# Patient Record
Sex: MALE | Race: WHITE | NOT HISPANIC OR LATINO | Employment: OTHER | URBAN - METROPOLITAN AREA
[De-identification: names, ages, dates, MRNs, and addresses within clinical notes are randomized per-mention and may not be internally consistent; named-entity substitution may affect disease eponyms.]

---

## 2021-02-05 ENCOUNTER — TELEPHONE (OUTPATIENT)
Dept: TRANSPLANT | Facility: CLINIC | Age: 66
End: 2021-02-05

## 2021-02-05 DIAGNOSIS — J44.9 CHRONIC OBSTRUCTIVE PULMONARY DISEASE, UNSPECIFIED COPD TYPE: Primary | ICD-10-CM

## 2021-02-05 DIAGNOSIS — Z76.82 LUNG TRANSPLANT CANDIDATE: ICD-10-CM

## 2021-02-08 ENCOUNTER — TELEPHONE (OUTPATIENT)
Dept: TRANSPLANT | Facility: CLINIC | Age: 66
End: 2021-02-08

## 2021-03-12 ENCOUNTER — TELEPHONE (OUTPATIENT)
Dept: TRANSPLANT | Facility: CLINIC | Age: 66
End: 2021-03-12

## 2021-03-16 ENCOUNTER — TELEPHONE (OUTPATIENT)
Dept: TRANSPLANT | Facility: CLINIC | Age: 66
End: 2021-03-16

## 2021-03-18 ENCOUNTER — HOSPITAL ENCOUNTER (OUTPATIENT)
Dept: PULMONOLOGY | Facility: CLINIC | Age: 66
Discharge: HOME | End: 2021-03-18
Attending: INTERNAL MEDICINE
Payer: COMMERCIAL

## 2021-03-18 ENCOUNTER — TELEPHONE (OUTPATIENT)
Dept: TRANSPLANT | Facility: CLINIC | Age: 66
End: 2021-03-18

## 2021-03-18 ENCOUNTER — OFFICE VISIT (OUTPATIENT)
Dept: TRANSPLANT | Facility: CLINIC | Age: 66
End: 2021-03-18
Attending: INTERNAL MEDICINE
Payer: COMMERCIAL

## 2021-03-18 ENCOUNTER — HOSPITAL ENCOUNTER (OUTPATIENT)
Dept: PULMONOLOGY | Facility: CLINIC | Age: 66
Discharge: HOME | End: 2021-03-18
Payer: COMMERCIAL

## 2021-03-18 ENCOUNTER — HOSPITAL ENCOUNTER (OUTPATIENT)
Dept: CARDIOLOGY | Facility: HOSPITAL | Age: 66
Discharge: HOME | End: 2021-03-18
Attending: INTERNAL MEDICINE
Payer: COMMERCIAL

## 2021-03-18 VITALS
DIASTOLIC BLOOD PRESSURE: 50 MMHG | HEIGHT: 70 IN | HEART RATE: 70 BPM | SYSTOLIC BLOOD PRESSURE: 90 MMHG | BODY MASS INDEX: 24.48 KG/M2 | WEIGHT: 171 LBS

## 2021-03-18 VITALS
HEIGHT: 66 IN | DIASTOLIC BLOOD PRESSURE: 57 MMHG | TEMPERATURE: 97 F | SYSTOLIC BLOOD PRESSURE: 105 MMHG | HEART RATE: 64 BPM | BODY MASS INDEX: 28.21 KG/M2 | RESPIRATION RATE: 20 BRPM | WEIGHT: 175.5 LBS | OXYGEN SATURATION: 91 %

## 2021-03-18 VITALS — HEIGHT: 66 IN | WEIGHT: 175.5 LBS | BODY MASS INDEX: 28.21 KG/M2

## 2021-03-18 DIAGNOSIS — Z76.82 LUNG TRANSPLANT CANDIDATE: ICD-10-CM

## 2021-03-18 DIAGNOSIS — J44.9 CHRONIC OBSTRUCTIVE PULMONARY DISEASE, UNSPECIFIED COPD TYPE: ICD-10-CM

## 2021-03-18 DIAGNOSIS — Z13.9 SCREENING PROCEDURE: Primary | ICD-10-CM

## 2021-03-18 DIAGNOSIS — R91.8 PULMONARY NODULES: ICD-10-CM

## 2021-03-18 DIAGNOSIS — J96.12 CHRONIC RESPIRATORY FAILURE WITH HYPOXIA AND HYPERCAPNIA: ICD-10-CM

## 2021-03-18 DIAGNOSIS — J96.11 CHRONIC RESPIRATORY FAILURE WITH HYPOXIA AND HYPERCAPNIA: ICD-10-CM

## 2021-03-18 DIAGNOSIS — J44.9 CHRONIC OBSTRUCTIVE PULMONARY DISEASE, UNSPECIFIED COPD TYPE: Primary | ICD-10-CM

## 2021-03-18 LAB
AMPHET+METHAMPHET UR QL: NEGATIVE
ASCENDING AORTA: 2.84 CM
AV INDEX (PROSTH): 0.82
AV MEAN GRADIENT: 3 MMHG
AV PEAK GRADIENT: 7 MMHG
AV VALVE AREA: 2.34 CM2
AV VELOCITY RATIO: 0.77
BARBITURATES UR QL SCN>200 NG/ML: NEGATIVE
BENZODIAZ UR QL SCN>200 NG/ML: NORMAL
BSA FOR ECHO PROCEDURE: 1.96 M2
BZE UR QL SCN: NEGATIVE
CANNABINOIDS UR QL SCN: NEGATIVE
CREAT UR-MCNC: 193 MG/DL (ref 23–375)
CV ECHO LV RWT: 0.35 CM
DLCO ADJ PRE: 6.03 ML/(MIN*MMHG) (ref 18.19–32.04)
DLCO SINGLE BREATH LLN: 18.19
DLCO SINGLE BREATH PRE REF: 24 %
DLCO SINGLE BREATH REF: 25.11
DLCOC SBVA LLN: 2.65
DLCOC SBVA PRE REF: 34.3 %
DLCOC SBVA REF: 3.96
DLCOC SINGLE BREATH LLN: 18.19
DLCOC SINGLE BREATH PRE REF: 24 %
DLCOC SINGLE BREATH REF: 25.11
DLCOCSBVAULN: 5.27
DLCOCSINGLEBREATHULN: 32.04
DLCOSINGLEBREATHULN: 32.04
DLCOVA LLN: 2.65
DLCOVA PRE REF: 34.3 %
DLCOVA PRE: 1.36 ML/(MIN*MMHG*L) (ref 2.65–5.27)
DLCOVA REF: 3.96
DLCOVAULN: 5.27
DLVAADJ PRE: 1.36 ML/(MIN*MMHG*L) (ref 2.65–5.27)
DOP CALC AO PEAK VEL: 1.35 M/S
DOP CALC AO VTI: 25 CM
DOP CALC LVOT AREA: 2.9 CM2
DOP CALC LVOT DIAMETER: 1.91 CM
DOP CALC LVOT PEAK VEL: 1.04 M/S
DOP CALC LVOT STROKE VOLUME: 58.62 CM3
DOP CALCLVOT PEAK VEL VTI: 20.47 CM
E WAVE DECELERATION TIME: 266.79 MSEC
E/A RATIO: 0.75
E/E' RATIO: 5.79 M/S
ECHO LV POSTERIOR WALL: 0.75 CM (ref 0.6–1.1)
ETHANOL UR-MCNC: <10 MG/DL
FEF 25 75 LLN: 1.14
FEF 25 75 PRE REF: 16.7 %
FEF 25 75 REF: 2.46
FEV05 LLN: 1.25
FEV05 REF: 2.38
FEV1 FVC LLN: 65
FEV1 FVC PRE REF: 51.7 %
FEV1 FVC REF: 77
FEV1 LLN: 2.22
FEV1 PRE REF: 36.3 %
FEV1 REF: 3
FRACTIONAL SHORTENING: 35 % (ref 28–44)
FVC LLN: 2.92
FVC PRE REF: 70.2 %
FVC REF: 3.88
INTERVENTRICULAR SEPTUM: 0.62 CM (ref 0.6–1.1)
IVC PRE: 2.53 L (ref 2.92–4.84)
IVC SINGLE BREATH LLN: 2.92
IVC SINGLE BREATH PRE REF: 65.1 %
IVC SINGLE BREATH REF: 3.88
IVCSINGLEBREATHULN: 4.84
IVRT: 114.18 MSEC
LA MAJOR: 5 CM
LA MINOR: 5.12 CM
LA WIDTH: 3.93 CM
LEFT ATRIUM SIZE: 3.45 CM
LEFT ATRIUM VOLUME INDEX MOD: 29.1 ML/M2
LEFT ATRIUM VOLUME INDEX: 29.9 ML/M2
LEFT ATRIUM VOLUME MOD: 56.83 CM3
LEFT ATRIUM VOLUME: 58.31 CM3
LEFT INTERNAL DIMENSION IN SYSTOLE: 2.76 CM (ref 2.1–4)
LEFT VENTRICLE DIASTOLIC VOLUME INDEX: 41.22 ML/M2
LEFT VENTRICLE DIASTOLIC VOLUME: 80.37 ML
LEFT VENTRICLE MASS INDEX: 43 G/M2
LEFT VENTRICLE SYSTOLIC VOLUME INDEX: 14.6 ML/M2
LEFT VENTRICLE SYSTOLIC VOLUME: 28.45 ML
LEFT VENTRICULAR INTERNAL DIMENSION IN DIASTOLE: 4.24 CM (ref 3.5–6)
LEFT VENTRICULAR MASS: 84.08 G
LV LATERAL E/E' RATIO: 5 M/S
LV SEPTAL E/E' RATIO: 6.88 M/S
METHADONE UR QL SCN>300 NG/ML: NEGATIVE
MV A" WAVE DURATION": 19.7 MSEC
MV PEAK A VEL: 0.73 M/S
MV PEAK E VEL: 0.55 M/S
MV STENOSIS PRESSURE HALF TIME: 77.37 MS
MV VALVE AREA P 1/2 METHOD: 2.84 CM2
OPIATES UR QL SCN: NORMAL
PCP UR QL SCN>25 NG/ML: NEGATIVE
PEF LLN: 5.99
PEF PRE REF: 43 %
PEF REF: 8.06
PHYSICIAN COMMENT: ABNORMAL
PISA TR MAX VEL: 3.22 M/S
PRE DLCO: 6.03 ML/(MIN*MMHG) (ref 18.19–32.04)
PRE FEF 25 75: 0.41 L/S (ref 1.14–3.78)
PRE FET 100: 8.2 SEC
PRE FEV05 REF: 31.3 %
PRE FEV1 FVC: 40.03 % (ref 64.66–90.21)
PRE FEV1: 1.09 L (ref 2.22–3.79)
PRE FEV5: 0.74 L (ref 1.25–3.52)
PRE FVC: 2.72 L (ref 2.92–4.84)
PRE PEF: 3.46 L/S (ref 5.99–10.13)
PULM VEIN S/D RATIO: 1.03
PV PEAK D VEL: 0.39 M/S
PV PEAK S VEL: 0.4 M/S
RA MAJOR: 4.36 CM
RA PRESSURE: 8 MMHG
RA WIDTH: 3.45 CM
RIGHT VENTRICLE STRAIN: 18
RIGHT VENTRICULAR END-DIASTOLIC DIMENSION: 4.2 CM
RV TISSUE DOPPLER FREE WALL SYSTOLIC VELOCITY 1 (APICAL 4 CHAMBER VIEW): 12.73 CM/S
SARS-COV-2 RDRP RESP QL NAA+PROBE: NEGATIVE
SINUS: 3.22 CM
STJ: 2.79 CM
TDI LATERAL: 0.11 M/S
TDI SEPTAL: 0.08 M/S
TDI: 0.1 M/S
TOXICOLOGY INFORMATION: NORMAL
TR MAX PG: 41 MMHG
TRICUSPID ANNULAR PLANE SYSTOLIC EXCURSION: 2.12 CM
TV REST PULMONARY ARTERY PRESSURE: 49 MMHG
VA PRE: 4.44 L (ref 6.19–6.19)
VA SINGLE BREATH LLN: 6.19
VA SINGLE BREATH PRE REF: 71.7 %
VA SINGLE BREATH REF: 6.19
VASINGLEBREATHULN: 6.19

## 2021-03-18 PROCEDURE — 94618 PULMONARY STRESS TESTING: CPT | Mod: S$GLB,TXP,, | Performed by: INTERNAL MEDICINE

## 2021-03-18 PROCEDURE — 93306 ECHO (CUPID ONLY): ICD-10-PCS | Mod: 26,TXP,, | Performed by: INTERNAL MEDICINE

## 2021-03-18 PROCEDURE — U0002 COVID-19 LAB TEST NON-CDC: HCPCS | Mod: TXP | Performed by: INTERNAL MEDICINE

## 2021-03-18 PROCEDURE — 94618 PULMONARY STRESS TESTING: ICD-10-PCS | Mod: S$GLB,TXP,, | Performed by: INTERNAL MEDICINE

## 2021-03-18 PROCEDURE — 1125F AMNT PAIN NOTED PAIN PRSNT: CPT | Mod: S$GLB,TXP,, | Performed by: INTERNAL MEDICINE

## 2021-03-18 PROCEDURE — 1125F PR PAIN SEVERITY QUANTIFIED, PAIN PRESENT: ICD-10-PCS | Mod: S$GLB,TXP,, | Performed by: INTERNAL MEDICINE

## 2021-03-18 PROCEDURE — 80323 ALKALOIDS NOS: CPT | Mod: TXP | Performed by: PHYSICIAN ASSISTANT

## 2021-03-18 PROCEDURE — 3008F BODY MASS INDEX DOCD: CPT | Mod: CPTII,S$GLB,TXP, | Performed by: INTERNAL MEDICINE

## 2021-03-18 PROCEDURE — 99204 OFFICE O/P NEW MOD 45 MIN: CPT | Mod: 25,S$GLB,TXP, | Performed by: INTERNAL MEDICINE

## 2021-03-18 PROCEDURE — 94727 PR PULM FUNCTION TEST BY GAS: ICD-10-PCS | Mod: 53,S$GLB,TXP, | Performed by: INTERNAL MEDICINE

## 2021-03-18 PROCEDURE — 94010 BREATHING CAPACITY TEST: CPT | Mod: S$GLB,TXP,, | Performed by: INTERNAL MEDICINE

## 2021-03-18 PROCEDURE — 93306 TTE W/DOPPLER COMPLETE: CPT | Mod: 26,TXP,, | Performed by: INTERNAL MEDICINE

## 2021-03-18 PROCEDURE — 80307 DRUG TEST PRSMV CHEM ANLYZR: CPT | Mod: TXP | Performed by: PHYSICIAN ASSISTANT

## 2021-03-18 PROCEDURE — 99999 PR PBB SHADOW E&M-EST. PATIENT-LVL V: CPT | Mod: PBBFAC,TXP,, | Performed by: INTERNAL MEDICINE

## 2021-03-18 PROCEDURE — 94727 GAS DIL/WSHOT DETER LNG VOL: CPT | Mod: 53,S$GLB,TXP, | Performed by: INTERNAL MEDICINE

## 2021-03-18 PROCEDURE — 99999 PR PBB SHADOW E&M-EST. PATIENT-LVL V: ICD-10-PCS | Mod: PBBFAC,TXP,, | Performed by: INTERNAL MEDICINE

## 2021-03-18 PROCEDURE — 94010 BREATHING CAPACITY TEST: ICD-10-PCS | Mod: S$GLB,TXP,, | Performed by: INTERNAL MEDICINE

## 2021-03-18 PROCEDURE — 99204 PR OFFICE/OUTPT VISIT, NEW, LEVL IV, 45-59 MIN: ICD-10-PCS | Mod: 25,S$GLB,TXP, | Performed by: INTERNAL MEDICINE

## 2021-03-18 PROCEDURE — 3008F PR BODY MASS INDEX (BMI) DOCUMENTED: ICD-10-PCS | Mod: CPTII,S$GLB,TXP, | Performed by: INTERNAL MEDICINE

## 2021-03-18 PROCEDURE — 94729 DIFFUSING CAPACITY: CPT | Mod: S$GLB,TXP,, | Performed by: INTERNAL MEDICINE

## 2021-03-18 PROCEDURE — 94729 PR C02/MEMBANE DIFFUSE CAPACITY: ICD-10-PCS | Mod: S$GLB,TXP,, | Performed by: INTERNAL MEDICINE

## 2021-03-18 PROCEDURE — 93306 TTE W/DOPPLER COMPLETE: CPT | Mod: TXP

## 2021-03-18 RX ORDER — ISOSORBIDE MONONITRATE 30 MG/1
30 TABLET, EXTENDED RELEASE ORAL DAILY
COMMUNITY
Start: 2021-03-02

## 2021-03-18 RX ORDER — ALBUTEROL SULFATE 0.83 MG/ML
2.5 SOLUTION RESPIRATORY (INHALATION) 2 TIMES DAILY PRN
COMMUNITY
Start: 2021-02-18

## 2021-03-18 RX ORDER — CARVEDILOL 6.25 MG/1
6.25 TABLET ORAL DAILY
COMMUNITY
Start: 2020-12-31

## 2021-03-18 RX ORDER — ALPRAZOLAM 0.25 MG/1
1 TABLET ORAL 3 TIMES DAILY PRN
COMMUNITY
Start: 2021-03-11

## 2021-03-18 RX ORDER — CARVEDILOL 12.5 MG/1
12.5 TABLET ORAL NIGHTLY
COMMUNITY
Start: 2020-12-31

## 2021-03-18 RX ORDER — ACETAMINOPHEN 160 MG/5ML
200 SUSPENSION, ORAL (FINAL DOSE FORM) ORAL 2 TIMES DAILY
COMMUNITY

## 2021-03-18 RX ORDER — OXYCODONE AND ACETAMINOPHEN 10; 325 MG/1; MG/1
1 TABLET ORAL EVERY 8 HOURS PRN
COMMUNITY
Start: 2021-03-06

## 2021-03-18 RX ORDER — SIMVASTATIN 10 MG/1
10 TABLET, FILM COATED ORAL NIGHTLY
COMMUNITY
Start: 2020-12-31

## 2021-03-18 RX ORDER — METOLAZONE 2.5 MG/1
2.5 TABLET ORAL
COMMUNITY
Start: 2021-02-25

## 2021-03-18 RX ORDER — SODIUM CHLORIDE FOR INHALATION 7 %
4 VIAL, NEBULIZER (ML) INHALATION 2 TIMES DAILY
COMMUNITY
Start: 2021-02-18

## 2021-03-18 RX ORDER — DOCUSATE SODIUM 100 MG/1
100 CAPSULE, LIQUID FILLED ORAL 2 TIMES DAILY PRN
COMMUNITY
Start: 2020-12-02

## 2021-03-18 RX ORDER — METFORMIN HYDROCHLORIDE 500 MG/1
500 TABLET, EXTENDED RELEASE ORAL 2 TIMES DAILY
COMMUNITY
Start: 2020-03-26

## 2021-03-18 RX ORDER — AMLODIPINE BESYLATE 5 MG/1
5 TABLET ORAL DAILY
COMMUNITY
Start: 2021-01-11

## 2021-03-18 RX ORDER — LISINOPRIL 10 MG/1
10 TABLET ORAL DAILY
COMMUNITY
Start: 2021-03-05

## 2021-03-18 RX ORDER — PANTOPRAZOLE SODIUM 40 MG/1
40 TABLET, DELAYED RELEASE ORAL DAILY
COMMUNITY
Start: 2021-03-06

## 2021-03-18 RX ORDER — ROPINIROLE 0.5 MG/1
0.5 TABLET, FILM COATED ORAL NIGHTLY PRN
COMMUNITY
Start: 2021-01-14

## 2021-03-18 RX ORDER — FLUTICASONE FUROATE, UMECLIDINIUM BROMIDE AND VILANTEROL TRIFENATATE 100; 62.5; 25 UG/1; UG/1; UG/1
1 POWDER RESPIRATORY (INHALATION) DAILY
COMMUNITY
Start: 2021-01-14

## 2021-03-22 LAB
ANABASINE UR-MCNC: <2 NG/ML
COTININE UR-MCNC: <5 NG/ML
NICOTINE UR-MCNC: <5 NG/ML
NORNICOTINE UR-MCNC: <2 NG/ML

## 2021-03-23 ENCOUNTER — TELEPHONE (OUTPATIENT)
Dept: TRANSPLANT | Facility: CLINIC | Age: 66
End: 2021-03-23

## 2021-03-25 ENCOUNTER — TELEPHONE (OUTPATIENT)
Dept: TRANSPLANT | Facility: CLINIC | Age: 66
End: 2021-03-25

## 2022-01-24 ENCOUNTER — OFFICE VISIT (OUTPATIENT)
Dept: PULMONOLOGY | Facility: CLINIC | Age: 67
End: 2022-01-24
Payer: MEDICARE

## 2022-01-24 VITALS
WEIGHT: 172.81 LBS | SYSTOLIC BLOOD PRESSURE: 115 MMHG | BODY MASS INDEX: 27.89 KG/M2 | OXYGEN SATURATION: 91 % | HEART RATE: 80 BPM | DIASTOLIC BLOOD PRESSURE: 72 MMHG

## 2022-01-24 DIAGNOSIS — R06.02 SHORTNESS OF BREATH: Primary | ICD-10-CM

## 2022-01-24 DIAGNOSIS — J44.9 CHRONIC OBSTRUCTIVE PULMONARY DISEASE, UNSPECIFIED COPD TYPE: ICD-10-CM

## 2022-01-24 DIAGNOSIS — J96.11 CHRONIC RESPIRATORY FAILURE WITH HYPOXIA: ICD-10-CM

## 2022-01-24 DIAGNOSIS — J47.9 BRONCHIECTASIS WITHOUT COMPLICATION: ICD-10-CM

## 2022-01-24 PROCEDURE — 99204 PR OFFICE/OUTPT VISIT, NEW, LEVL IV, 45-59 MIN: ICD-10-PCS | Mod: S$GLB,,, | Performed by: INTERNAL MEDICINE

## 2022-01-24 PROCEDURE — 99204 OFFICE O/P NEW MOD 45 MIN: CPT | Mod: S$GLB,,, | Performed by: INTERNAL MEDICINE

## 2022-01-24 RX ORDER — DOXYCYCLINE 100 MG/1
CAPSULE ORAL
COMMUNITY
Start: 2021-12-30

## 2022-01-24 RX ORDER — ESCITALOPRAM OXALATE 10 MG/1
TABLET ORAL
COMMUNITY
Start: 2021-12-14

## 2022-01-24 RX ORDER — PREDNISONE 10 MG/1
TABLET ORAL
COMMUNITY
Start: 2021-12-30

## 2022-01-24 RX ORDER — BLOOD-GLUCOSE METER
KIT MISCELLANEOUS
COMMUNITY
Start: 2022-01-19

## 2022-01-24 NOTE — PROGRESS NOTES
SUBJECTIVE:    Patient ID: Praneeth Rueda is a 66 y.o. male.    Chief Complaint: Emphysema (Ref by dr leija )    HPI The patient is here to be evaluated for possible IBV valve placement.  He has been off of cigarettes for 10 years.  He has prior PFT's showing hyperinflation.  He also has some bronchiectasis and produces about 4 ounces of mucus daily. He is short of breath walking 20 feet.  Past Medical History:   Diagnosis Date    COPD, severe     Degenerative disc disease, lumbar     Diabetes     Hypertension      Past Surgical History:   Procedure Laterality Date    FINGER AMPUTATION      right index finger    VASECTOMY       Family History   Problem Relation Age of Onset    Stroke Mother     Heart attack Mother     Alcohol abuse Father     Heart attack Maternal Grandfather     Breast cancer Maternal Aunt     Bone cancer Paternal Uncle     Pulmonary embolism Neg Hx         Social History:   Marital Status:   Occupation: Data Unavailable  Alcohol History:  reports previous alcohol use.  Tobacco History:  reports that he has quit smoking. His smoking use included cigarettes. He has a 30.00 pack-year smoking history. He has quit using smokeless tobacco.  His smokeless tobacco use included chew.  Drug History:  reports no history of drug use.    Review of patient's allergies indicates:   Allergen Reactions    Keflex [cephalexin] Anaphylaxis and Rash    Furosemide Itching and Rash       Current Outpatient Medications   Medication Sig Dispense Refill    albuterol (PROVENTIL HFA/VENTOLIN HFA) 90 mcg/Actuation inhaler Every 4 hours PRN      albuterol (PROVENTIL) 2.5 mg /3 mL (0.083 %) nebulizer solution Inhale 2.5 mg into the lungs 2 (two) times daily as needed.      ALPRAZolam (XANAX) 0.25 MG tablet Take 1 tablet by mouth 3 (three) times daily as needed.      amLODIPine (NORVASC) 5 MG tablet Take 5 mg by mouth once daily.      aspirin 81 mg Tab Take 1 tablet by mouth once daily. Every day       budesonide-formoterol 160-4.5 mcg (SYMBICORT) 160-4.5 mcg/actuation HFAA Twice a day      carvediloL (COREG) 12.5 MG tablet Take 12.5 mg by mouth every evening.      carvediloL (COREG) 6.25 MG tablet Take 6.25 mg by mouth once daily.      coenzyme Q10 200 mg capsule Take 200 mg by mouth 2 (two) times a day.      EScitalopram oxalate (LEXAPRO) 10 MG tablet       FREESTYLE LITE STRIPS Strp       isosorbide mononitrate (IMDUR) 30 MG 24 hr tablet Take 30 mg by mouth once daily.      lisinopriL 10 MG tablet Take 10 mg by mouth once daily.      metFORMIN (GLUCOPHAGE-XR) 500 MG ER 24hr tablet Take 500 mg by mouth 2 (two) times a day.      metOLazone (ZAROXOLYN) 2.5 MG tablet Take 2.5 mg by mouth twice a week. And as needed for fluid retention      oxyCODONE-acetaminophen (PERCOCET)  mg per tablet Take 1 tablet by mouth every 8 (eight) hours as needed.      OXYGEN-AIR DELIVERY SYSTEMS MISC 6 L/min by Nasal route continuous.      pantoprazole (PROTONIX) 40 MG tablet Take 40 mg by mouth once daily.      rOPINIRole (REQUIP) 0.5 MG tablet Take 0.5 mg by mouth nightly as needed.      simvastatin (ZOCOR) 10 MG tablet Take 10 mg by mouth nightly.      sodium chloride 7% 7 % nebulizer solution Take 4 mLs by nebulization 2 (two) times a day.      TRELEGY ELLIPTA 100-62.5-25 mcg DsDv Inhale 1 puff into the lungs once daily.      aspirin-calcium carbonate 81 mg-300 mg calcium(777 mg) Tab Take 81 mg by mouth once daily.      docusate sodium (COLACE) 100 MG capsule Take 100 mg by mouth 2 (two) times daily as needed.      doxycycline (VIBRAMYCIN) 100 MG Cap       hydrocodone-acetaminophen (VICODIN ES) 7.5-750 mg per tablet Take 1 tablet by mouth every 6 (six) hours as needed for Pain. Four times a day PRN (Patient not taking: Reported on 1/24/2022) 30 tablet 00    mirtazapine (REMERON) 30 MG tablet At bedtime      predniSONE (DELTASONE) 10 MG tablet       testosterone 12.5 mg/ 1.25 gram (1 %) GlPm Every  day       No current facility-administered medications for this visit.       Alpha-1 Antitrypsin:  Last PFT:   Last CT:    Review of Systems  General: Feeling Well.  Eyes: Vision is good.  ENT:  No sinusitis or pharyngitis.   Heart:: No chest pain or palpitations.  Lungs: No cough, sputum, or wheezing.  GI: No Nausea, vomiting, constipation, diarrhea, or reflux.  : No dysuria, hesitancy, or nocturia.  Musculoskeletal: No joint pain or myalgias.  Skin: No lesions or rashes.  Neuro: No headaches or neuropathy.  Lymph: No edema or adenopathy.  Psych: No anxiety or depression.  Endo: No weight change.    OBJECTIVE:      /72 (BP Location: Left arm, Patient Position: Sitting)   Pulse 80   Wt 78.4 kg (172 lb 12.8 oz)   SpO2 (!) 91% Comment: 6.0 oxygen level  BMI 27.89 kg/m²     Physical Exam  GENERAL: Older patient in no distress, sitting on a scooter.  HEENT: Pupils equal and reactive. Extraocular movements intact. Nose intact.      Pharynx moist.  NECK: Supple.   HEART: Regular rate and rhythm. No murmur or gallop auscultated.  LUNGS: Clear to auscultation and percussion. Lung excursion symmetrical. No change in fremitus. No adventitial noises.  ABDOMEN: Bowel sounds present. Non-tender, no masses palpated.  EXTREMITIES: Normal muscle tone and joint movement, no cyanosis or clubbing.   LYMPHATICS: No adenopathy palpated, 2+ edema to L leg..  SKIN: Dry, intact, no lesions.   NEURO: Cranial nerves II-XII intact. Motor strength 5/5 bilaterally, upper and lower extremities.  PSYCH: Appropriate affect.    Assessment:       1. Shortness of breath    2. Chronic obstructive pulmonary disease, unspecified COPD type    3. Chronic respiratory failure with hypoxia    4. Bronchiectasis without complication        The patient wishes to be evaluated for possible IBV valve placement.  We will see what his dedicated CT and PFT's show.  Plan:       Shortness of breath    Chronic obstructive pulmonary disease, unspecified COPD  type  -     Complete PFT with bronchodilator; Future  -     CT Chest Without Contrast; Future; Expected date: 01/24/2022    Chronic respiratory failure with hypoxia    Bronchiectasis without complication         Follow up in about 1 month (around 2/24/2022).

## 2022-02-10 ENCOUNTER — HOSPITAL ENCOUNTER (OUTPATIENT)
Dept: PULMONOLOGY | Facility: HOSPITAL | Age: 67
Discharge: HOME | End: 2022-02-10
Attending: INTERNAL MEDICINE
Payer: MEDICARE

## 2022-02-10 ENCOUNTER — HOSPITAL ENCOUNTER (OUTPATIENT)
Dept: RADIOLOGY | Facility: HOSPITAL | Age: 67
Discharge: HOME | End: 2022-02-10
Attending: INTERNAL MEDICINE
Payer: MEDICARE

## 2022-02-10 DIAGNOSIS — J44.9 CHRONIC OBSTRUCTIVE PULMONARY DISEASE, UNSPECIFIED COPD TYPE: ICD-10-CM

## 2022-02-10 PROCEDURE — 94729 DIFFUSING CAPACITY: CPT

## 2022-02-10 PROCEDURE — 71250 CT THORAX DX C-: CPT | Mod: TC

## 2022-02-10 PROCEDURE — 94010 BREATHING CAPACITY TEST: CPT

## 2022-02-10 PROCEDURE — 94727 GAS DIL/WSHOT DETER LNG VOL: CPT

## 2022-02-10 PROCEDURE — 94060 EVALUATION OF WHEEZING: CPT

## 2022-05-04 ENCOUNTER — OFFICE VISIT (OUTPATIENT)
Dept: PULMONOLOGY | Facility: CLINIC | Age: 67
End: 2022-05-04
Payer: MEDICARE

## 2022-05-04 VITALS
BODY MASS INDEX: 27.97 KG/M2 | SYSTOLIC BLOOD PRESSURE: 110 MMHG | HEIGHT: 66 IN | DIASTOLIC BLOOD PRESSURE: 80 MMHG | HEART RATE: 67 BPM | OXYGEN SATURATION: 75 % | WEIGHT: 174 LBS

## 2022-05-04 DIAGNOSIS — J44.9 CHRONIC OBSTRUCTIVE PULMONARY DISEASE, UNSPECIFIED COPD TYPE: Primary | ICD-10-CM

## 2022-05-04 DIAGNOSIS — M81.0 OSTEOPOROSIS, UNSPECIFIED OSTEOPOROSIS TYPE, UNSPECIFIED PATHOLOGICAL FRACTURE PRESENCE: ICD-10-CM

## 2022-05-04 DIAGNOSIS — J96.11 CHRONIC RESPIRATORY FAILURE WITH HYPOXIA: ICD-10-CM

## 2022-05-04 PROCEDURE — 99214 PR OFFICE/OUTPT VISIT, EST, LEVL IV, 30-39 MIN: ICD-10-PCS | Mod: S$GLB,,, | Performed by: INTERNAL MEDICINE

## 2022-05-04 PROCEDURE — 99214 OFFICE O/P EST MOD 30 MIN: CPT | Mod: S$GLB,,, | Performed by: INTERNAL MEDICINE

## 2022-05-04 NOTE — PROGRESS NOTES
SUBJECTIVE:    Patient ID: Praneeth Rueda is a 66 y.o. male.    Chief Complaint: Shortness of Breath    HPI the patient returns after CT shows predominant upper lobe emphysema but it does extend into the middle lobes.  There is also a 4 mm nodule.  The pulmonary functions show severe obstruction with an extremely severe diffusion defect but moderate restriction.  He continues to cough up approximately a half a cup of mucus a day.  He is hypoxemic here on a pulsed 6 L in addition concentrator.  He knows he is supposed to be on continuous flow at 8 L.  Past Medical History:   Diagnosis Date    COPD, severe     Degenerative disc disease, lumbar     Diabetes     Hypertension      Past Surgical History:   Procedure Laterality Date    FINGER AMPUTATION      right index finger    VASECTOMY       Family History   Problem Relation Age of Onset    Stroke Mother     Heart attack Mother     Alcohol abuse Father     Heart attack Maternal Grandfather     Breast cancer Maternal Aunt     Bone cancer Paternal Uncle     Pulmonary embolism Neg Hx         Social History:   Marital Status:   Occupation: Data Unavailable  Alcohol History:  reports previous alcohol use.  Tobacco History:  reports that he quit smoking about 7 years ago. His smoking use included cigarettes. He started smoking about 51 years ago. He has a 30.00 pack-year smoking history. He has quit using smokeless tobacco.  His smokeless tobacco use included chew.  Drug History:  reports no history of drug use.    Review of patient's allergies indicates:   Allergen Reactions    Keflex [cephalexin] Anaphylaxis and Rash    Furosemide Itching and Rash       Current Outpatient Medications   Medication Sig Dispense Refill    albuterol (PROVENTIL HFA/VENTOLIN HFA) 90 mcg/Actuation inhaler Every 4 hours PRN      albuterol (PROVENTIL) 2.5 mg /3 mL (0.083 %) nebulizer solution Inhale 2.5 mg into the lungs 2 (two) times daily as needed.      ALPRAZolam  (XANAX) 0.25 MG tablet Take 1 tablet by mouth 3 (three) times daily as needed.      amLODIPine (NORVASC) 5 MG tablet Take 5 mg by mouth once daily.      aspirin 81 mg Tab Take 1 tablet by mouth once daily. Every day      aspirin-calcium carbonate 81 mg-300 mg calcium(777 mg) Tab Take 81 mg by mouth once daily.      budesonide-formoterol 160-4.5 mcg (SYMBICORT) 160-4.5 mcg/actuation HFAA Twice a day      carvediloL (COREG) 12.5 MG tablet Take 12.5 mg by mouth every evening.      carvediloL (COREG) 6.25 MG tablet Take 6.25 mg by mouth once daily.      coenzyme Q10 200 mg capsule Take 200 mg by mouth 2 (two) times a day.      docusate sodium (COLACE) 100 MG capsule Take 100 mg by mouth 2 (two) times daily as needed.      doxycycline (VIBRAMYCIN) 100 MG Cap       EScitalopram oxalate (LEXAPRO) 10 MG tablet       FREESTYLE LITE STRIPS Strp       hydrocodone-acetaminophen (VICODIN ES) 7.5-750 mg per tablet Take 1 tablet by mouth every 6 (six) hours as needed for Pain. Four times a day PRN (Patient not taking: Reported on 1/24/2022) 30 tablet 00    isosorbide mononitrate (IMDUR) 30 MG 24 hr tablet Take 30 mg by mouth once daily.      lisinopriL 10 MG tablet Take 10 mg by mouth once daily.      metFORMIN (GLUCOPHAGE-XR) 500 MG ER 24hr tablet Take 500 mg by mouth 2 (two) times a day.      metOLazone (ZAROXOLYN) 2.5 MG tablet Take 2.5 mg by mouth twice a week. And as needed for fluid retention      mirtazapine (REMERON) 30 MG tablet At bedtime      oxyCODONE-acetaminophen (PERCOCET)  mg per tablet Take 1 tablet by mouth every 8 (eight) hours as needed.      OXYGEN-AIR DELIVERY SYSTEMS MISC 6 L/min by Nasal route continuous.      pantoprazole (PROTONIX) 40 MG tablet Take 40 mg by mouth once daily.      predniSONE (DELTASONE) 10 MG tablet       rOPINIRole (REQUIP) 0.5 MG tablet Take 0.5 mg by mouth nightly as needed.      simvastatin (ZOCOR) 10 MG tablet Take 10 mg by mouth nightly.      sodium  "chloride 7% 7 % nebulizer solution Take 4 mLs by nebulization 2 (two) times a day.      testosterone 12.5 mg/ 1.25 gram (1 %) GlPm Every day      MANOLO ELLIPTA 100-62.5-25 mcg DsDv Inhale 1 puff into the lungs once daily.       No current facility-administered medications for this visit.       Alpha-1 Antitrypsin:  Last PFT:   Last CT:    Review of Systems   Respiratory: Positive for shortness of breath.      General: Feeling okay except for his breathing.  Eyes: Vision is good.  ENT:  No sinusitis or pharyngitis.   Heart:: No chest pain or palpitations.  Lungs:  He produces lots of sputum all day long.  He is very short of breath with any exertion.  GI: No Nausea, vomiting, constipation, diarrhea, or reflux.  : No dysuria, hesitancy, or nocturia.  Musculoskeletal: No joint pain or myalgias.  Skin: No lesions or rashes.  Neuro: No headaches or neuropathy.  Lymph: No edema or adenopathy.  Psych: No anxiety or depression.  Endo: No weight change.    OBJECTIVE:      /80 (BP Location: Left arm, Patient Position: Sitting, BP Method: Medium (Manual))   Pulse 67   Ht 5' 6" (1.676 m)   Wt 78.9 kg (174 lb)   SpO2 (!) 75% Comment: 6LPM PD  BMI 28.08 kg/m²     Physical Exam  GENERAL: Older patient in no distress, sitting on a scooter.  HEENT: Pupils equal and reactive. Extraocular movements intact. Nose intact.      Pharynx moist.  NECK: Supple.   HEART: Regular rate and rhythm. No murmur or gallop auscultated.  LUNGS: Clear to auscultation and percussion. Lung excursion symmetrical. No change in fremitus. No adventitial noises.  ABDOMEN: Bowel sounds present. Non-tender, no masses palpated.  EXTREMITIES: Normal muscle tone and joint movement, no cyanosis or clubbing.   LYMPHATICS: No adenopathy palpated, 2+ edema to L leg..  SKIN: Dry, intact, no lesions.   NEURO: Cranial nerves II-XII intact. Motor strength 5/5 bilaterally, upper and lower extremities.  PSYCH: Appropriate affect.    Assessment:       1. " Chronic obstructive pulmonary disease, unspecified COPD type    2. Chronic respiratory failure with hypoxia    3. Osteoporosis, unspecified osteoporosis type, unspecified pathological fracture presence        Severe COPD but also moderate restrictive changes and an extremely  severe diffusion defect.  Impressive emphysema extends into the middle lobe and lingula.  There is a 4 mm stable nodule.  The patient also has copious secretions on a daily basis.  Unfortunately, these things preclude him being eligible for IBV valves.  Plan:       Chronic obstructive pulmonary disease, unspecified COPD type    Chronic respiratory failure with hypoxia    Osteoporosis, unspecified osteoporosis type, unspecified pathological fracture presence         No follow-ups on file.    Talk to your doctor about your osteoporosis  Wear 8 liters of continuous flow